# Patient Record
Sex: MALE | Race: BLACK OR AFRICAN AMERICAN | NOT HISPANIC OR LATINO | Employment: FULL TIME | URBAN - METROPOLITAN AREA
[De-identification: names, ages, dates, MRNs, and addresses within clinical notes are randomized per-mention and may not be internally consistent; named-entity substitution may affect disease eponyms.]

---

## 2017-06-05 ENCOUNTER — OFFICE VISIT (OUTPATIENT)
Dept: PODIATRY | Facility: CLINIC | Age: 57
End: 2017-06-05
Payer: COMMERCIAL

## 2017-06-05 VITALS
DIASTOLIC BLOOD PRESSURE: 100 MMHG | HEIGHT: 73 IN | WEIGHT: 239 LBS | BODY MASS INDEX: 31.68 KG/M2 | SYSTOLIC BLOOD PRESSURE: 150 MMHG

## 2017-06-05 DIAGNOSIS — M20.10 HALLUX ABDUCTO VALGUS, UNSPECIFIED LATERALITY: ICD-10-CM

## 2017-06-05 DIAGNOSIS — M20.5X9 HALLUX LIMITUS, UNSPECIFIED LATERALITY: ICD-10-CM

## 2017-06-05 DIAGNOSIS — L92.9 GRANULOMA OF GREAT TOE: ICD-10-CM

## 2017-06-05 DIAGNOSIS — B35.3 TINEA PEDIS OF LEFT FOOT: ICD-10-CM

## 2017-06-05 DIAGNOSIS — E11.9 COMPREHENSIVE DIABETIC FOOT EXAMINATION, TYPE 2 DM, ENCOUNTER FOR: Primary | ICD-10-CM

## 2017-06-05 PROCEDURE — 99203 OFFICE O/P NEW LOW 30 MIN: CPT | Mod: S$GLB,,, | Performed by: PODIATRIST

## 2017-06-05 PROCEDURE — 99999 PR PBB SHADOW E&M-EST. PATIENT-LVL III: CPT | Mod: PBBFAC,,, | Performed by: PODIATRIST

## 2017-06-05 PROCEDURE — 4010F ACE/ARB THERAPY RXD/TAKEN: CPT | Mod: S$GLB,,, | Performed by: PODIATRIST

## 2017-06-05 RX ORDER — ECONAZOLE NITRATE 10 MG/G
CREAM TOPICAL 2 TIMES DAILY
Qty: 85 G | Refills: 1 | Status: SHIPPED | OUTPATIENT
Start: 2017-06-05 | End: 2017-06-05 | Stop reason: CLARIF

## 2017-06-05 NOTE — PROGRESS NOTES
Subjective:      Patient ID: Messi Santana is a 56 y.o. male.    Chief Complaint: Diabetes Mellitus (top of left foot and smallest toe on right ft Pcp Dr. Kilgore 05/16/2017); Diabetic Foot Exam; Nail Care; and Foot Ulcer    Messi is a 56 y.o. male who presents to the clinic for evaluation and treatment of high risk feet. Messi has a past medical history of Diabetes mellitus type II; GERD (gastroesophageal reflux disease); Hyperlipidemia; and Hypertension. The patient's chief complaint is a lesion to the dorsal lateral left foot and lesion to the 5th right foot. Currently being treated with PO lamisil and topical antifungals   This patient has documented high risk feet requiring routine maintenance secondary to diabetes mellitis and those secondary complications of diabetes, as mentioned..    PCP: Yonatan Kilgore MD    Date Last Seen by PCP:   Chief Complaint   Patient presents with    Diabetes Mellitus     top of left foot and smallest toe on right ft Pcp Dr. Kilgore 05/16/2017    Diabetic Foot Exam    Nail Care    Foot Ulcer     On his feet several hours daily in warehouse    Current shoe gear:   casual shoes    Hemoglobin A1C   Date Value Ref Range Status   10/23/2014 9.6 (H) 4.5 - 6.2 % Final   07/07/2014 12.4 (H) 4.5 - 6.2 % Final   10/13/2012 13.4 (H) 4.0 - 6.2 % Final         Patient Active Problem List   Diagnosis    HTN (hypertension)    Type II or unspecified type diabetes mellitus without mention of complication, uncontrolled    GERD (gastroesophageal reflux disease)    Hyperlipidemia    CVA (cerebral infarction)    Hyperlipidemia    Left arm weakness       Current Outpatient Prescriptions on File Prior to Visit   Medication Sig Dispense Refill    amlodipine (NORVASC) 10 MG tablet TAKE 1 TABLET(10 MG) BY MOUTH EVERY DAY 30 tablet 3    atorvastatin (LIPITOR) 80 MG tablet Take 1 tablet (80 mg total) by mouth once daily. 90 tablet 1    blood sugar diagnostic (FREESTYLE LITE STRIPS) Strp 2 times qd prn  200 each 10    blood-glucose meter (FREESTYLE LITE METER) kit 2 times prn 1 each 0    ciprofloxacin HCl (CIPRO) 500 MG tablet Take 1 tablet (500 mg total) by mouth every 12 (twelve) hours. 14 tablet 0    clobetasol (TEMOVATE) 0.05 % cream Apply topically 2 (two) times daily. 30 g 3    cloNIDine (CATAPRES) 0.2 MG tablet Take 1 tablet (0.2 mg total) by mouth 2 (two) times daily. 180 tablet 1    cloNIDine (CATAPRES) 0.2 MG tablet TAKE 1 TABLET BY MOUTH TWICE DAILY 60 tablet 6    fluticasone (FLONASE) 50 mcg/actuation nasal spray 1-2 sprays by Each Nare route once daily. 1 Bottle 0    furosemide (LASIX) 20 MG tablet Take 1 tablet (20 mg total) by mouth once daily. 30 tablet 5    glyBURIDE (DIABETA) 5 MG tablet TAKE 2 TABLET BY MOUTH TWICE DAILY WITH MEALS 180 tablet 1    ketoconazole (NIZORAL) 2 % cream Apply topically once daily. 30 g 3    labetalol (NORMODYNE) 200 MG tablet TAKE 2 TABLETS(400 MG) BY MOUTH EVERY 12 HOURS 120 tablet 0    LANCETS & BLOOD GLUCOSE STRIPS MISC .  TESTS BID      lancets (FREESTYLE LANCETS) 28 gauge Misc Inject 1 lancet into the skin 2 (two) times daily as needed. 200 each 10    lisinopril (PRINIVIL,ZESTRIL) 40 MG tablet TAKE 1 TABLET BY MOUTH EVERY DAY 30 tablet 3    metformin (GLUCOPHAGE-XR) 500 MG 24 hr tablet Take 1 tablet (500 mg total) by mouth 2 (two) times daily with meals. 180 tablet 1    naproxen (NAPROSYN) 500 MG tablet Take 1 tablet (500 mg total) by mouth 2 (two) times daily with meals. 30 tablet 1    potassium chloride (KLOR-CON) 10 MEQ TbSR Take 1 tablet (10 mEq total) by mouth once daily. 30 tablet 5    terbinafine HCl (LAMISIL) 250 mg tablet Take 1 tablet (250 mg total) by mouth once daily. 30 tablet 2    tramadol (ULTRAM) 50 mg tablet Take 1 tablet (50 mg total) by mouth every 12 (twelve) hours as needed for Pain. 20 tablet 1    albuterol 90 mcg/actuation inhaler Inhale 2 puffs into the lungs every 6 (six) hours as needed for Wheezing. 18 g 0    aspirin  "81 MG Chew Take 1 tablet (81 mg total) by mouth once daily. 30 tablet 3     No current facility-administered medications on file prior to visit.        Review of patient's allergies indicates:  No Known Allergies    Past Surgical History:   Procedure Laterality Date    NO PAST SURGERIES         Family History   Problem Relation Age of Onset    Diabetes Mother     Hyperlipidemia Mother     Cancer Mother      pancreatic    Diabetes Father     Hyperlipidemia Father        Social History     Social History    Marital status:      Spouse name: N/A    Number of children: N/A    Years of education: N/A     Occupational History    Not on file.     Social History Main Topics    Smoking status: Never Smoker    Smokeless tobacco: Not on file    Alcohol use 2.7 oz/week     3 Standard drinks or equivalent, 2 Cans of beer per week      Comment: daily     Drug use: No    Sexual activity: Not on file     Other Topics Concern    Not on file     Social History Narrative    No narrative on file     Review of Systems   Constitution: Negative for chills, fever and weakness.   Cardiovascular: Positive for leg swelling. Negative for claudication.   Respiratory: Negative for cough and shortness of breath.    Skin: Positive for dry skin, nail changes and suspicious lesions. Negative for itching and rash.   Musculoskeletal: Negative for arthritis, falls, joint pain, joint swelling and muscle weakness.   Gastrointestinal: Negative for diarrhea, nausea and vomiting.   Neurological: Positive for numbness and paresthesias. Negative for tremors.   Psychiatric/Behavioral: Negative for altered mental status and hallucinations.           Objective:       Vitals:    06/05/17 0847   BP: (!) 150/100   Weight: 108.4 kg (239 lb)   Height: 6' 1" (1.854 m)   PainSc:   7   PainLoc: Foot       Physical Exam   Constitutional:  Non-toxic appearance. He does not have a sickly appearance. No distress.   Cardiovascular:   Pulses:       " Dorsalis pedis pulses are 2+ on the right side, and 2+ on the left side.        Posterior tibial pulses are 2+ on the right side, and 2+ on the left side.   dorsalis pedis and posterior tibial pulses are palpable bilaterally. Capillary refill time is within normal limits.   Pulmonary/Chest: No respiratory distress.   Musculoskeletal:        Right ankle: He exhibits decreased range of motion. No tenderness. No lateral malleolus, no medial malleolus, no AITFL, no CF ligament and no posterior TFL tenderness found. Achilles tendon exhibits no pain, no defect and normal Handy's test results.        Left ankle: He exhibits decreased range of motion. No tenderness. No lateral malleolus, no medial malleolus, no AITFL, no CF ligament and no posterior TFL tenderness found. Achilles tendon exhibits no pain, no defect and normal Handy's test results.        Right foot: There is no bony tenderness.        Left foot: There is no bony tenderness.   Biomechanical exam: Decreased first MPJ range of motion both weightbearing and nonweightbearing, no crepitus observed the first MP joint, + dorsal flag sign.Mild  bunion deformity is observed. Decreased stride, station of gait.  apropulsive toe off.  Increased angle and base of gait.  Patient has hammertoes of digits 2-5 bilateral partially reducible without symptom today. There is equinus deformity bilateral with decreased dorsiflexion at the ankle joint bilateral. No tenderness with compression of heel. Negative tinels sign. Gait analysis reveals excessive pronation through midstance and propulsion with early heel off. Shoes reveals lateral heel counter wear bilateral    Neurological: He has normal strength. He displays no atrophy and no tremor. Coordination normal.   [place] 5.07 monofilament is intact bilateral feet. Sharp/dull sensation is also intact Bilateral feet.   Skin: Skin is warm, dry and intact. Capillary refill takes 2 to 3 seconds. Lesion noted. No  abrasion, no bruising, no burn and no laceration noted. He is not diaphoretic. There is erythema. No cyanosis. No pallor. Nails show no clubbing.   Scaling dry plaque to dorsal lateral left foot with associated erythema as pictured    Medial right 5th  Digit ingrown nail plate. Surrounding erythema and minimal edema is noted there is granuloma formation noted. no malodor          Psychiatric: His mood appears not anxious. His affect is not inappropriate. His speech is not slurred. He is not combative. He is communicative. He is attentive.   Nursing note reviewed.                    Assessment:       Encounter Diagnoses   Name Primary?    Comprehensive diabetic foot examination, type 2 DM, encounter for Yes    Granuloma of great toe - Right Foot     Tinea pedis of left foot     Hallux abducto valgus, unspecified laterality     Hallux limitus, unspecified laterality          Plan:       Messi was seen today for diabetes mellitus, diabetic foot exam, nail care and foot ulcer.    Diagnoses and all orders for this visit:    Comprehensive diabetic foot examination, type 2 DM, encounter for  -     DIABETIC SHOES FOR HOME USE    Granuloma of great toe - Right Foot    Tinea pedis of left foot    Hallux abducto valgus, unspecified laterality  -     DIABETIC SHOES FOR HOME USE    Hallux limitus, unspecified laterality  -     DIABETIC SHOES FOR HOME USE    Other orders  -     Discontinue: econazole nitrate 1 % cream; Apply topically 2 (two) times daily.      I counseled the patient on his conditions, their implications and medical management.    - Shoe inspection. Diabetic Foot Education. Patient reminded of the importance of good nutrition and blood sugar control to help prevent podiatric complications of diabetes. Patient instructed on proper foot hygeine. We discussed wearing proper shoe gear, daily foot inspections, never walking without protective shoe gear, never putting sharp instruments to feet    Greater than  50% of this visit spent on counseling and coordination of care.    Greater than 20 minutes spent discussing wound healing cycle, healing, infection control, risk of bone infection. Adequate vitamin supplementation, protein intake, and hydration - discussed with patient    Silver nitrate to granuloma of right 5th digit.  Well tolerated.  Home wound care instructions dispensed.    Instructed patient on the importance of keeping feet dry. Patient instructed to use absorbent cotton socks and change them if they become sweaty; or wear an open-toe shoe or sandal. Wash the feet at least once a day with soap and water. Apply the antifungal gel as prescribed. Instructed patient that it takes time for symptoms to completely dissipate. Patient instructed to use lysol or over-the-counter antifungal powders or sprays to shoes daily and allow them to air dry, switching shoes from every other day would be optimal. Patient is to avoid barefoot walking in  high-risk environments (public showers, gyms and locker rooms) may prevent future infections.     Patient to RTC if:  Increasing redness or swelling of the foot   Pus draining from cracks in the skin   Fever of 100.4ºF (38ºC) or higher    Rx diabetic shoes for protection and support    He will continue to monitor the areas daily, inspect his feet, wear protective shoe gear when ambulatory, moisturizer to maintain skin integrity and follow in this office in approximately 2-3 weeks, sooner p.r.n.

## 2017-06-05 NOTE — PATIENT INSTRUCTIONS
Wound care Instructions:   · Once a day beginning in 24 hours::   ¨ Clean the toe separate from your body with warm running water and antibacterial soap such as dial for five minutes.  ¨ Clean any remaining crust away with soap and water using a cotton-tipped applicator.  ¨ DRY COMPLETELY  ¨ Apply betadine to the toe.  ¨ Cover with a breathable bandage until there is no more drainage or open flesh.  · Change the dressing daily, or whenever it becomes wet or dirty.  · If you were prescribed antibiotics, take them as directed until they are all gone.  · Wear comfortable shoes with a lot of toe room, or open-toe sandals, while your toe is healing.  · You may use acetaminophen or ibuprofen to control pain, unless another medicine was prescribed. If you have chronic liver or kidney disease or ever had a stomach ulcer or GI bleeding, talk with your doctor before using these medicines.      When to seek medical advice  Call your health care provider right away if any of the following occur:  · Increasing redness, pain or swelling of the toe  · Red streaks in the skin leading away from the wound  · Continued pus or fluid drainage for more than 24 hours  · Fever of 100.4º F (38º C) or higher, or as directed by your health care provider        Athletes Foot     Athletes Foot is caused by a fungal infection in the skin. It affects the skin between the toes where it causes fissures (cracks in the skin). It can also affect the bottom of the foot where it causes dry white scales and peeling of the skin. This infection is more likely to occur when the foot is in hot, sweaty socks and shoes for long periods of time.   This infection is treated with skin creams or oral medicine.     Home Care:   It is important to keep the feet dry. Use absorbent cotton socks and change them if they become sweaty; or wear an open-toe shoe or sandal. Wash the feet at least once a day with soap and water.   Rotate your shoes. If you must wear  the same shoes everyday then spray the shoes with lysol or antifungal spray and allow that to dry overnight before wearing the shoes again  Apply the antifungal cream as prescribed. Some antifungal creams are available without a prescription (Lotrimin, Tinactin).   It may take a week before the rash starts to improve and it can take about three to four weeks to completely clear. Continue the medicine until the rash is all gone.   Use over-the-counter antifungal powders or sprays on your feet after exposure to high-risk environments (public showers, gyms and locker rooms) may prevent future infections. You may wish to use appropriate footwear to reduce exposure.  Clean tubs and bathroom floor with bleach  Clean feet with Nizoral shampoo or dial antibacterial soap and then dry completely.    Follow Up   with your doctor as recommended by our staff if the rash is not starting to improve after TEN days of treatment, or if the rash continues to spread.     Get Prompt Medical Attention   if any of the following occur:   Increasing redness or swelling of the foot   Pus draining from cracks in the skin   Fever of 100.4ºF (38ºC) or higher, or as directed by your healthcare provider    © 8309-4205 [place] [address]. All rights reserved. This information is not intended as a substitute for professional medical care. Always follow your healthcare professional's instructions.           Recommend lotions: eucerin, aquaphor, A&D ointment, gold bond for diabetics, sween    Shoe recommendations: (try 6pm.Exo Protein Bars, zappos.Exo Protein Bars , nordstromrack.com, or shoes.Exo Protein Bars for discounted prices) you can visit DSW shoes in [place] as well    Asics (GT 1000 or gel foundations), new balance, saucony (stabil c3),  Zaragoza (transcend), vionic, propet (tennis shoe)    soft brand, clarks, crocs, aerosoles, naturalizers, SAS, ecco, naveen, song su, josephines (dress shoes)    Vionic, volitiles, burkenstocks, fitflops,  propet (sandals)    Nike comfort thong sandals, crocs (house shoes)    Nail Home remedy:  Vicks Vapor rub OR Listerine and apple cider vinegar in a spray bottle to nails    Occasional soaks for 15-20 mins in luke warm water with 1 cup of listerine and 1 cup of apple cider vinegar are ok You may add several drops of oil of oregano or tea tree oil as well      Diabetes: Inspecting Your Feet  Diabetes increases your chances of developing foot problems. So inspect your feet every day. This helps you find small skin irritations before they become serious infections. If you have trouble seeing the bottoms of your feet, use a mirror or ask a family member or friend to help.     Pressure spots on the bottom of the foot are common areas where problems develop.   How to check your feet  Below are tips to help you look for foot problems. Try to check your feet at the same time each day, such as when you get out of bed in the morning:  · Check the top of each foot. The tops of toes, back of the heel, and outer edge of the foot can get a lot of rubbing from poor-fitting shoes.  · Check the bottom of each foot. Daily wear and tear often leads to problems at pressure spots.  · Check the toes and nails. Fungal infections often occur between toes. Toenail problems can also be a sign of fungal infections or lead to breaks in the skin.  · Check your shoes, too. Loose objects inside a shoe can injure the foot. Use your hand to feel inside your shoes for things like alaina, loose stitching, or rough areas that could irritate your skin.  Warning signs  Look for any color changes in the foot. Redness with streaks can signal a severe infection, which needs immediate medical attention. Tell your doctor right away if you have any of these problems:  · Swelling, sometimes with color changes, may be a sign of poor blood flow or infection. Symptoms include tenderness and an increase in the size of your foot.  · Warm or hot areas on your feet  may be signs of infection. A foot that is cold may not be getting enough blood.  · Sensations such as burning, tingling, or pins and needles can be signs of a problem. Also check for areas that may be numb.  · Hot spots are caused by friction or pressure. Look for hot spots in areas that get a lot of rubbing. Hot spots can turn into blisters, calluses, or sores.  · Cracks and sores are caused by dry or irritated skin. They are a sign that the skin is breaking down, which can lead to infection.  · Toenail problems to watch for include nails growing into the skin (ingrown toenail) and causing redness or pain. Thick, yellow, or discolored nails can signal a fungal infection.  · Drainage and odor can develop from untreated sores and ulcers. Call your doctor right away if you notice white or yellow drainage, bleeding, or unpleasant odor.   © 2584-6255 [employer]. [address] [place] [address], [place] [address]. All rights reserved. This information is not intended as a substitute for professional medical care. Always follow your healthcare professional's instructions.

## 2017-06-05 NOTE — LETTER
June 5, 2017      Yonatan Kilgore MD  [address] [place] [address]           [facility]  [address] [facility] [facility] [address]  Phone: [phone]          Patient: Messi Santana   MR Number: [id number]   YOB: 1960   Date of Visit: 6/5/2017       Dear Dr. Yonatan Kilgore:    Thank you for referring Messi Santana to me for evaluation. Attached you will find relevant portions of my assessment and plan of care.    If you have questions, please do not hesitate to call me. I look forward to following Messi Santana along with you.    Sincerely,    Tram Gregory DPM    Enclosure  CC:  No Recipients    If you would like to receive this communication electronically, please contact [email or web address] or [phone] to request more information on [email or web address] Link access.    For providers and/or their staff who would like to refer a patient to [facility], please contact us through our one-stop-shop provider referral line, [facility], at [phone].    If you feel you have received this communication in error or would no longer like to receive these types of communications, please e-mail [email or web address]

## 2017-08-15 RX ORDER — CLONIDINE HYDROCHLORIDE 0.2 MG/1
TABLET ORAL
Qty: 60 TABLET | Refills: 3 | Status: SHIPPED | OUTPATIENT
Start: 2017-08-15 | End: 2018-01-27 | Stop reason: SDUPTHER

## 2018-01-27 RX ORDER — CLONIDINE HYDROCHLORIDE 0.2 MG/1
TABLET ORAL
Qty: 60 TABLET | Refills: 3 | Status: SHIPPED | OUTPATIENT
Start: 2018-01-27 | End: 2018-06-28 | Stop reason: SDUPTHER

## 2018-08-25 ENCOUNTER — HOSPITAL ENCOUNTER (EMERGENCY)
Facility: HOSPITAL | Age: 58
Discharge: HOME | End: 2018-08-25
Attending: EMERGENCY MEDICINE
Payer: COMMERCIAL

## 2018-08-25 VITALS
DIASTOLIC BLOOD PRESSURE: 112 MMHG | OXYGEN SATURATION: 98 % | HEIGHT: 74 IN | BODY MASS INDEX: 30.16 KG/M2 | TEMPERATURE: 98 F | RESPIRATION RATE: 18 BRPM | WEIGHT: 235 LBS | HEART RATE: 84 BPM | SYSTOLIC BLOOD PRESSURE: 179 MMHG

## 2018-08-25 DIAGNOSIS — L97.529 ULCER OF TOE OF LEFT FOOT, UNSPECIFIED ULCER STAGE: Primary | ICD-10-CM

## 2018-08-25 PROCEDURE — 99283 EMERGENCY DEPT VISIT LOW MDM: CPT

## 2018-08-25 PROCEDURE — 25000003 PHARM REV CODE 250: Performed by: PHYSICIAN ASSISTANT

## 2018-08-25 RX ORDER — MUPIROCIN 20 MG/G
1 OINTMENT TOPICAL
Status: COMPLETED | OUTPATIENT
Start: 2018-08-25 | End: 2018-08-25

## 2018-08-25 RX ADMIN — MUPIROCIN 22 G: 20 OINTMENT TOPICAL at 02:08

## 2018-08-25 NOTE — ED TRIAGE NOTES
Pt c/o injury to LEFT pinky toe (noticed this AM). Pt states he woke up and noticed it was red, irritated, and painful. Pt is a diabetic and states he thinks the toe may be infected. Pain 4/10.

## 2018-08-25 NOTE — ED PROVIDER NOTES
Encounter Date: 8/25/2018    SCRIBE #1 NOTE: I, Lucrecia Parkinson, am scribing for, and in the presence of,  Gonzalo Martino PA-C. I have scribed the following portions of the note - Other sections scribed: HPI and ROS.       History     Chief Complaint   Patient presents with    Toe Injury     pt reports being a diabetic and noticing a bruise on left pinky toe, unknown of when or how injured, thinks it may be infected     CC: Toe Pain    HPI: This 57 y.o male who has DM, GERD, HLD, and HTN presents to the ED for an evaluation of acute onset, constant left 5th toe pain that began this morning.  Patient reports noticing a wound to his left lateral 5th toe and the plantar aspect of the foot.  Patient denies any recent falls, trauma, or injuries.  Patient denies fever, chills, extremity numbness, extremity weakness, or any other associated symptoms.  No prior tx.  No alleviating factors.      The history is provided by the patient. No  was used.     Review of patient's allergies indicates:  No Known Allergies  Past Medical History:   Diagnosis Date    Diabetes mellitus type II     GERD (gastroesophageal reflux disease)     Hyperlipidemia     Hypertension      Past Surgical History:   Procedure Laterality Date    NO PAST SURGERIES       Family History   Problem Relation Age of Onset    Diabetes Mother     Hyperlipidemia Mother     Cancer Mother         pancreatic    Diabetes Father     Hyperlipidemia Father      Social History     Tobacco Use    Smoking status: Never Smoker   Substance Use Topics    Alcohol use: Yes     Alcohol/week: 2.7 oz     Types: 3 Standard drinks or equivalent, 2 Cans of beer per week     Comment: daily     Drug use: No     Review of Systems   Constitutional: Negative for chills and fever.   HENT: Negative for ear pain and sore throat.    Respiratory: Negative for cough and shortness of breath.    Cardiovascular: Negative for chest pain.   Gastrointestinal: Negative for  abdominal pain, diarrhea, nausea and vomiting.   Musculoskeletal: Negative for back pain.        (-) arm or leg problems   Skin: Positive for wound. Negative for rash.   Neurological: Negative for weakness, numbness and headaches.       Physical Exam     Initial Vitals [08/25/18 1341]   BP Pulse Resp Temp SpO2   (!) 179/112 84 18 98.4 °F (36.9 °C) 98 %      MAP       --         Physical Exam    Vitals reviewed.  Constitutional: He appears well-developed and well-nourished. He is not diaphoretic. No distress.   HENT:   Head: Normocephalic and atraumatic.   Right Ear: External ear normal.   Left Ear: External ear normal.   Nose: Nose normal.   Eyes: Conjunctivae are normal. No scleral icterus.   Neck: Normal range of motion. Neck supple.   Cardiovascular: Normal rate, regular rhythm and intact distal pulses.   Pulmonary/Chest: No respiratory distress.   Musculoskeletal: Normal range of motion.   Neurological: He is alert and oriented to person, place, and time. No sensory deficit.   Skin: Skin is warm and dry. Capillary refill takes less than 2 seconds. No rash noted. No erythema.   Linear ulcerations under the 4th and 5th digits of the left foot. No erthema, edema, drainage, or bullous lesion. No other skin changes of the toes or foot.          ED Course   Procedures  Labs Reviewed - No data to display       Imaging Results    None          Medical Decision Making:   ED Management:  57-year-old male with diabetes has to small linear ulcerations underneath the 4th and 5th toes of the left foot.  No evidence of active bacterial infection.  Minimal tenderness at the area of the wound.  He has no systemic symptoms.  Patient slightly hypertensive in the ED, but no evidence of end-organ damage at this time.  The area was cleaned, and antibiotic ointment was applied.  Patient instructed to clean foot at least twice daily, apply ointment.  In between applying ointment he was and instructed to keep the foot clean and as dry  as possible.  Patient instructed to follow up with Podiatry for re-evaluation.  He verbalized understanding and agreed with plan.            Scribe Attestation:   Scribe #1: I performed the above scribed service and the documentation accurately describes the services I performed. I attest to the accuracy of the note.    Attending Attestation:           Physician Attestation for Scribe:  Physician Attestation Statement for Scribe #1: I, Gonzalo Martino PA-C, reviewed documentation, as scribed by Lucrecia Parkinson in my presence, and it is both accurate and complete.                    Clinical Impression:   The encounter diagnosis was Ulcer of toe of left foot, unspecified ulcer stage.                             Gonzalo Martino PA-C  08/25/18 1930

## 2018-08-25 NOTE — DISCHARGE INSTRUCTIONS
Clean ulcer twice daily and apply ointment twice daily after cleaning.  Make appointment with Podiatry.